# Patient Record
Sex: FEMALE | Race: WHITE | Employment: UNEMPLOYED | URBAN - METROPOLITAN AREA
[De-identification: names, ages, dates, MRNs, and addresses within clinical notes are randomized per-mention and may not be internally consistent; named-entity substitution may affect disease eponyms.]

---

## 2018-01-31 ENCOUNTER — HOSPITAL ENCOUNTER (EMERGENCY)
Age: 53
Discharge: HOME | End: 2018-01-31
Attending: EMERGENCY MEDICINE | Admitting: EMERGENCY MEDICINE
Payer: SELF-PAY

## 2018-01-31 VITALS
DIASTOLIC BLOOD PRESSURE: 110 MMHG | TEMPERATURE: 98.4 F | WEIGHT: 140 LBS | RESPIRATION RATE: 18 BRPM | HEIGHT: 65 IN | SYSTOLIC BLOOD PRESSURE: 184 MMHG | HEART RATE: 88 BPM | OXYGEN SATURATION: 98 % | BODY MASS INDEX: 23.32 KG/M2

## 2018-01-31 DIAGNOSIS — I10 ESSENTIAL HYPERTENSION: Primary | ICD-10-CM

## 2018-01-31 LAB
ANION GAP SERPL CALC-SCNC: 9 MMOL/L (ref 5–15)
BASOPHILS # BLD: 0.1 K/UL (ref 0–0.1)
BASOPHILS NFR BLD: 1 % (ref 0–1)
BUN SERPL-MCNC: 10 MG/DL (ref 6–20)
BUN/CREAT SERPL: 10 (ref 12–20)
CALCIUM SERPL-MCNC: 9.6 MG/DL (ref 8.5–10.1)
CHLORIDE SERPL-SCNC: 102 MMOL/L (ref 97–108)
CO2 SERPL-SCNC: 29 MMOL/L (ref 21–32)
CREAT SERPL-MCNC: 1.03 MG/DL (ref 0.55–1.02)
DIFFERENTIAL METHOD BLD: ABNORMAL
EOSINOPHIL # BLD: 0.1 K/UL (ref 0–0.4)
EOSINOPHIL NFR BLD: 1 % (ref 0–7)
ERYTHROCYTE [DISTWIDTH] IN BLOOD BY AUTOMATED COUNT: 17.7 % (ref 11.5–14.5)
GLUCOSE SERPL-MCNC: 85 MG/DL (ref 65–100)
HCT VFR BLD AUTO: 45.7 % (ref 35–47)
HGB BLD-MCNC: 14.3 G/DL (ref 11.5–16)
IMM GRANULOCYTES # BLD: 0 K/UL (ref 0–0.04)
IMM GRANULOCYTES NFR BLD AUTO: 0 % (ref 0–0.5)
LYMPHOCYTES # BLD: 2.9 K/UL (ref 0.8–3.5)
LYMPHOCYTES NFR BLD: 25 % (ref 12–49)
MAGNESIUM SERPL-MCNC: 2.2 MG/DL (ref 1.6–2.4)
MCH RBC QN AUTO: 26 PG (ref 26–34)
MCHC RBC AUTO-ENTMCNC: 31.3 G/DL (ref 30–36.5)
MCV RBC AUTO: 83.1 FL (ref 80–99)
MONOCYTES # BLD: 0.7 K/UL (ref 0–1)
MONOCYTES NFR BLD: 6 % (ref 5–13)
NEUTS SEG # BLD: 7.6 K/UL (ref 1.8–8)
NEUTS SEG NFR BLD: 67 % (ref 32–75)
NRBC # BLD: 0 K/UL (ref 0–0.01)
NRBC BLD-RTO: 0 PER 100 WBC
PLATELET # BLD AUTO: 354 K/UL (ref 150–400)
PMV BLD AUTO: 8.8 FL (ref 8.9–12.9)
POTASSIUM SERPL-SCNC: 3.4 MMOL/L (ref 3.5–5.1)
RBC # BLD AUTO: 5.5 M/UL (ref 3.8–5.2)
SODIUM SERPL-SCNC: 140 MMOL/L (ref 136–145)
WBC # BLD AUTO: 11.3 K/UL (ref 3.6–11)

## 2018-01-31 PROCEDURE — 74011250637 HC RX REV CODE- 250/637: Performed by: EMERGENCY MEDICINE

## 2018-01-31 PROCEDURE — 80048 BASIC METABOLIC PNL TOTAL CA: CPT | Performed by: EMERGENCY MEDICINE

## 2018-01-31 PROCEDURE — 85025 COMPLETE CBC W/AUTO DIFF WBC: CPT | Performed by: EMERGENCY MEDICINE

## 2018-01-31 PROCEDURE — 94762 N-INVAS EAR/PLS OXIMTRY CONT: CPT

## 2018-01-31 PROCEDURE — 83735 ASSAY OF MAGNESIUM: CPT | Performed by: EMERGENCY MEDICINE

## 2018-01-31 PROCEDURE — 36415 COLL VENOUS BLD VENIPUNCTURE: CPT | Performed by: EMERGENCY MEDICINE

## 2018-01-31 PROCEDURE — 99284 EMERGENCY DEPT VISIT MOD MDM: CPT

## 2018-01-31 RX ORDER — LISINOPRIL 5 MG/1
5 TABLET ORAL DAILY
Qty: 30 TAB | Refills: 0 | Status: SHIPPED | OUTPATIENT
Start: 2018-01-31 | End: 2018-03-02

## 2018-01-31 RX ORDER — LISINOPRIL 5 MG/1
5 TABLET ORAL
Status: COMPLETED | OUTPATIENT
Start: 2018-01-31 | End: 2018-01-31

## 2018-01-31 RX ADMIN — LISINOPRIL 5 MG: 5 TABLET ORAL at 15:41

## 2018-01-31 NOTE — DISCHARGE INSTRUCTIONS
High Blood Pressure: Care Instructions  Your Care Instructions    If your blood pressure is usually above 140/90, you have high blood pressure, or hypertension. That means the top number is 140 or higher or the bottom number is 90 or higher, or both. Despite what a lot of people think, high blood pressure usually doesn't cause headaches or make you feel dizzy or lightheaded. It usually has no symptoms. But it does increase your risk for heart attack, stroke, and kidney or eye damage. The higher your blood pressure, the more your risk increases. Your doctor will give you a goal for your blood pressure. Your goal will be based on your health and your age. An example of a goal is to keep your blood pressure below 140/90. Lifestyle changes, such as eating healthy and being active, are always important to help lower blood pressure. You might also take medicine to reach your blood pressure goal.  Follow-up care is a key part of your treatment and safety. Be sure to make and go to all appointments, and call your doctor if you are having problems. It's also a good idea to know your test results and keep a list of the medicines you take. How can you care for yourself at home? Medical treatment  · If you stop taking your medicine, your blood pressure will go back up. You may take one or more types of medicine to lower your blood pressure. Be safe with medicines. Take your medicine exactly as prescribed. Call your doctor if you think you are having a problem with your medicine. · Talk to your doctor before you start taking aspirin every day. Aspirin can help certain people lower their risk of a heart attack or stroke. But taking aspirin isn't right for everyone, because it can cause serious bleeding. · See your doctor regularly. You may need to see the doctor more often at first or until your blood pressure comes down.   · If you are taking blood pressure medicine, talk to your doctor before you take decongestants or anti-inflammatory medicine, such as ibuprofen. Some of these medicines can raise blood pressure. · Learn how to check your blood pressure at home. Lifestyle changes  · Stay at a healthy weight. This is especially important if you put on weight around the waist. Losing even 10 pounds can help you lower your blood pressure. · If your doctor recommends it, get more exercise. Walking is a good choice. Bit by bit, increase the amount you walk every day. Try for at least 30 minutes on most days of the week. You also may want to swim, bike, or do other activities. · Avoid or limit alcohol. Talk to your doctor about whether you can drink any alcohol. · Try to limit how much sodium you eat to less than 2,300 milligrams (mg) a day. Your doctor may ask you to try to eat less than 1,500 mg a day. · Eat plenty of fruits (such as bananas and oranges), vegetables, legumes, whole grains, and low-fat dairy products. · Lower the amount of saturated fat in your diet. Saturated fat is found in animal products such as milk, cheese, and meat. Limiting these foods may help you lose weight and also lower your risk for heart disease. · Do not smoke. Smoking increases your risk for heart attack and stroke. If you need help quitting, talk to your doctor about stop-smoking programs and medicines. These can increase your chances of quitting for good. When should you call for help? Call 911 anytime you think you may need emergency care. This may mean having symptoms that suggest that your blood pressure is causing a serious heart or blood vessel problem. Your blood pressure may be over 180/110. ? For example, call 911 if:  ? · You have symptoms of a heart attack. These may include:  ¨ Chest pain or pressure, or a strange feeling in the chest.  ¨ Sweating. ¨ Shortness of breath. ¨ Nausea or vomiting.   ¨ Pain, pressure, or a strange feeling in the back, neck, jaw, or upper belly or in one or both shoulders or arms.  ¨ Lightheadedness or sudden weakness. ¨ A fast or irregular heartbeat. ? · You have symptoms of a stroke. These may include:  ¨ Sudden numbness, tingling, weakness, or loss of movement in your face, arm, or leg, especially on only one side of your body. ¨ Sudden vision changes. ¨ Sudden trouble speaking. ¨ Sudden confusion or trouble understanding simple statements. ¨ Sudden problems with walking or balance. ¨ A sudden, severe headache that is different from past headaches. ? · You have severe back or belly pain. ?Do not wait until your blood pressure comes down on its own. Get help right away. ?Call your doctor now or seek immediate care if:  ? · Your blood pressure is much higher than normal (such as 180/110 or higher), but you don't have symptoms. ? · You think high blood pressure is causing symptoms, such as:  ¨ Severe headache. ¨ Blurry vision. ? Watch closely for changes in your health, and be sure to contact your doctor if:  ? · Your blood pressure measures 140/90 or higher at least 2 times. That means the top number is 140 or higher or the bottom number is 90 or higher, or both. ? · You think you may be having side effects from your blood pressure medicine. ? · Your blood pressure is usually normal, but it goes above normal at least 2 times. Where can you learn more? Go to http://marielena-alfredo.info/. Enter G432 in the search box to learn more about \"High Blood Pressure: Care Instructions. \"  Current as of: September 21, 2016  Content Version: 11.4  © 0189-6197 [employer]. Care instructions adapted under license by [employer] (which disclaims liability or warranty for this information). If you have questions about a medical condition or this instruction, always ask your healthcare professional. [place] [address] any warranty or liability for your use of this information.          Low Sodium Diet (2,000 Milligram): Care Instructions  Your Care Instructions    Too much sodium causes your body to hold on to extra water. This can raise your blood pressure and force your heart and kidneys to work harder. In very serious cases, this could cause you to be put in the hospital. It might even be life-threatening. By limiting sodium, you will feel better and lower your risk of serious problems. The most common source of sodium is salt. People get most of the salt in their diet from canned, prepared, and packaged foods. Fast food and restaurant meals also are very high in sodium. Your doctor will probably limit your sodium to less than 2,000 milligrams (mg) a day. This limit counts all the sodium in prepared and packaged foods and any salt you add to your food. Follow-up care is a key part of your treatment and safety. Be sure to make and go to all appointments, and call your doctor if you are having problems. It's also a good idea to know your test results and keep a list of the medicines you take. How can you care for yourself at home? Read food labels  · Read labels on cans and food packages. The labels tell you how much sodium is in each serving. Make sure that you look at the serving size. If you eat more than the serving size, you have eaten more sodium. · Food labels also tell you the Percent Daily Value for sodium. Choose products with low Percent Daily Values for sodium. · Be aware that sodium can come in forms other than salt, including monosodium glutamate (MSG), sodium citrate, and sodium bicarbonate (baking soda). MSG is often added to Asian food. When you eat out, you can sometimes ask for food without MSG or added salt. Buy low-sodium foods  · Buy foods that are labeled \"unsalted\" (no salt added), \"sodium-free\" (less than 5 mg of sodium per serving), or \"low-sodium\" (less than 140 mg of sodium per serving). Foods labeled \"reduced-sodium\" and \"light sodium\" may still have too much sodium.  Be sure to read the label to see how much sodium you are getting. · Buy fresh vegetables, or frozen vegetables without added sauces. Buy low-sodium versions of canned vegetables, soups, and other canned goods. Prepare low-sodium meals  · Cut back on the amount of salt you use in cooking. This will help you adjust to the taste. Do not add salt after cooking. One teaspoon of salt has about 2,300 mg of sodium. · Take the salt shaker off the table. · Flavor your food with garlic, lemon juice, onion, vinegar, herbs, and spices. Do not use soy sauce, lite soy sauce, steak sauce, onion salt, garlic salt, celery salt, mustard, or ketchup on your food. · Use low-sodium salad dressings, sauces, and ketchup. Or make your own salad dressings and sauces without adding salt. · Use less salt (or none) when recipes call for it. You can often use half the salt a recipe calls for without losing flavor. Other foods such as rice, pasta, and grains do not need added salt. · Rinse canned vegetables, and cook them in fresh water. This removes some-but not all-of the salt. · Avoid water that is naturally high in sodium or that has been treated with water softeners, which add sodium. Call your local water company to find out the sodium content of your water supply. If you buy bottled water, read the label and choose a sodium-free brand. Avoid high-sodium foods  · Avoid eating:  ¨ Smoked, cured, salted, and canned meat, fish, and poultry. ¨ Ham, santamaria, hot dogs, and luncheon meats. ¨ Regular, hard, and processed cheese and regular peanut butter. ¨ Crackers with salted tops, and other salted snack foods such as pretzels, chips, and salted popcorn. ¨ Frozen prepared meals, unless labeled low-sodium. ¨ Canned and dried soups, broths, and bouillon, unless labeled sodium-free or low-sodium. ¨ Canned vegetables, unless labeled sodium-free or low-sodium. ¨ [employer] fries, pizza, tacos, and other fast foods.   ¨ Pickles, olives, ketchup, and other condiments, especially soy sauce, unless labeled sodium-free or low-sodium. Where can you learn more? Go to http://marielena-alfredo.info/. Enter I570 in the search box to learn more about \"Low Sodium Diet (2,000 Milligram): Care Instructions. \"  Current as of: May 12, 2017  Content Version: 11.4  © 6985-6064 [employer]. Care instructions adapted under license by [employer] (which disclaims liability or warranty for this information). If you have questions about a medical condition or this instruction, always ask your healthcare professional. [place] [address] any warranty or liability for your use of this information.

## 2018-01-31 NOTE — ED PROVIDER NOTES
HPI Comments: 47 y.o. female with no significant past medical history who presents from home via private vehicle with chief complaint of sinus congestion and HTN. Pt reports that she has had 3 weeks of persistent sinus congestion and pressure that she has been taking Ibuprofen for with minimal relief. However, she has been checking her BP regularly throughout the past few weeks and has noted that her blood pressure has been elevated. Today she had two tiffany readings of 185/115 and 191/117 that concerned her and prompted her to come to the ED. She reports that her brother was recently discharged from the hospital following a CVA which made her increasingly concerned about her elevated BP. She reports that several years ago she was seen at a [facility] clinic and had a mildly elevated BP at that time and was given a sample pack of a BP medication, but after taking a few doses of the medication had markedly low BP and stopped taking it. She reports that she has not had any problems with her BP since that event until now. Pt denies CP, SOB, palpitations, leg swelling, nausea, vomiting, abdominal pain, change in urination, fever, chills. There are no other acute medical concerns at this time. Social hx: Pt admits that she smokes cigarettes daily. Significant FMHx:  PCP: Kaden Wasserman NP  Note written by rae Hernandez, as dictated by Keenan Orellaan MD 2:08 PM        The history is provided by the patient. No past medical history on file. No past surgical history on file. Family History:   Problem Relation Age of Onset    Hypertension Mother     Thyroid Disease Mother     Cancer Father        Social History     Social History    Marital status: UNKNOWN     Spouse name: N/A    Number of children: N/A    Years of education: N/A     Occupational History    Not on file.      Social History Main Topics    Smoking status: Current Every Day Smoker     Packs/day: 0.50    Smokeless tobacco: Current User    Alcohol use Yes      Comment: Rare     Drug use: No    Sexual activity: Not on file     Other Topics Concern    Not on file     Social History Narrative    No narrative on file         ALLERGIES: Review of patient's allergies indicates no known allergies. Review of Systems   Constitutional: Negative for chills and fever. HENT: Positive for congestion, rhinorrhea and sinus pressure. Negative for sore throat. Respiratory: Negative for cough and shortness of breath. Gastrointestinal: Negative for abdominal pain, constipation, diarrhea, nausea and vomiting. Genitourinary: Negative for dysuria, flank pain, frequency and hematuria. Musculoskeletal: Negative for back pain. All other systems reviewed and are negative. Vitals:    01/31/18 1341   BP: (!) 167/109   Pulse: 95   Resp: 16   Temp: 98.4 °F (36.9 °C)   SpO2: 100%   Weight: 63.5 kg (140 lb)   Height: 5' 5\" (1.651 m)            Physical Exam   Constitutional: She is oriented to person, place, and time. She appears well-developed and well-nourished. No distress. HENT:   Head: Normocephalic and atraumatic. Eyes: Conjunctivae and EOM are normal. Pupils are equal, round, and reactive to light. Neck: Normal range of motion. Cardiovascular: Normal rate, regular rhythm, normal heart sounds and intact distal pulses. Exam reveals no friction rub. No murmur heard. Pulmonary/Chest: Effort normal and breath sounds normal. No respiratory distress. She has no wheezes. She has no rales. Abdominal: Soft. Bowel sounds are normal. She exhibits no distension. There is no tenderness. There is no rebound and no guarding. Musculoskeletal: Normal range of motion. She exhibits no edema or tenderness. Neurological: She is alert and oriented to person, place, and time. No cranial nerve deficit. Coordination normal.   Skin: Skin is warm and dry. She is not diaphoretic. No pallor.    Psychiatric: She has a normal mood and affect. Her behavior is normal.   Nursing note and vitals reviewed. MDM  Number of Diagnoses or Management Options  Essential hypertension:   Diagnosis management comments: High bp- No cp. Check creatinine,Leg cramps- last seconds and move- does not sound like dvt, arterial issue, check lytes ? Restless legs electrolytes for leg cramps though palpable pulses and well appearing          Amount and/or Complexity of Data Reviewed  Clinical lab tests: ordered and reviewed  Obtain history from someone other than the patient: yes (family)    Patient Progress  Patient progress: stable        ED Course       Procedures    3:25 PM  Spoke with pt and discussed risks of uncontrolled htn and reasons to return. She will need to follow up with Rosalinda Nowak and will get care card info on way out. She will likely need meds adjusted.  Discussed side effects of lisinopril including angioedema but she wants to try it because her brother is on it

## 2018-01-31 NOTE — ED TRIAGE NOTES
Pt states having elevated blood pressure for maico two weeks. Does not have a PCP. States she feels bad all over, aches in her legs. Initially thought it was from sinus pressure.

## 2018-01-31 NOTE — ED NOTES
Dr. Rick Weinstein reviewed discharge instructions with the patient. The patient verbalized understanding.

## 2018-12-07 ENCOUNTER — HOSPITAL ENCOUNTER (EMERGENCY)
Age: 53
Discharge: HOME | End: 2018-12-07
Attending: EMERGENCY MEDICINE
Payer: SELF-PAY

## 2018-12-07 VITALS
WEIGHT: 140 LBS | HEIGHT: 65 IN | HEART RATE: 72 BPM | RESPIRATION RATE: 16 BRPM | OXYGEN SATURATION: 99 % | SYSTOLIC BLOOD PRESSURE: 160 MMHG | TEMPERATURE: 98.1 F | DIASTOLIC BLOOD PRESSURE: 108 MMHG | BODY MASS INDEX: 23.32 KG/M2

## 2018-12-07 DIAGNOSIS — I10 ESSENTIAL HYPERTENSION: ICD-10-CM

## 2018-12-07 DIAGNOSIS — B34.9 VIRAL ILLNESS: Primary | ICD-10-CM

## 2018-12-07 DIAGNOSIS — E86.0 MILD DEHYDRATION: ICD-10-CM

## 2018-12-07 LAB
ANION GAP SERPL CALC-SCNC: 10 MMOL/L (ref 5–15)
ANION GAP SERPL CALC-SCNC: 12 MMOL/L (ref 5–15)
APPEARANCE UR: ABNORMAL
BACTERIA URNS QL MICRO: ABNORMAL /HPF
BASOPHILS # BLD: 0.1 K/UL (ref 0–0.1)
BASOPHILS NFR BLD: 1 % (ref 0–1)
BILIRUB UR QL CFM: NEGATIVE
BUN SERPL-MCNC: 10 MG/DL (ref 6–20)
BUN SERPL-MCNC: 9 MG/DL (ref 6–20)
BUN/CREAT SERPL: 10 (ref 12–20)
BUN/CREAT SERPL: 13 (ref 12–20)
CALCIUM SERPL-MCNC: 9.6 MG/DL (ref 8.5–10.1)
CALCIUM SERPL-MCNC: 9.7 MG/DL (ref 8.5–10.1)
CHLORIDE SERPL-SCNC: 105 MMOL/L (ref 97–108)
CHLORIDE SERPL-SCNC: 105 MMOL/L (ref 97–108)
CO2 SERPL-SCNC: 20 MMOL/L (ref 21–32)
CO2 SERPL-SCNC: 27 MMOL/L (ref 21–32)
COLOR UR: ABNORMAL
COMMENT, HOLDF: NORMAL
CREAT SERPL-MCNC: 0.76 MG/DL (ref 0.55–1.02)
CREAT SERPL-MCNC: 0.92 MG/DL (ref 0.55–1.02)
DIFFERENTIAL METHOD BLD: ABNORMAL
EOSINOPHIL # BLD: 0 K/UL (ref 0–0.4)
EOSINOPHIL NFR BLD: 0 % (ref 0–7)
EPITH CASTS URNS QL MICRO: ABNORMAL /LPF
ERYTHROCYTE [DISTWIDTH] IN BLOOD BY AUTOMATED COUNT: 13.8 % (ref 11.5–14.5)
GLUCOSE SERPL-MCNC: 107 MG/DL (ref 65–100)
GLUCOSE SERPL-MCNC: 108 MG/DL (ref 65–100)
GLUCOSE UR STRIP.AUTO-MCNC: NEGATIVE MG/DL
HCT VFR BLD AUTO: 50.2 % (ref 35–47)
HGB BLD-MCNC: 16.8 G/DL (ref 11.5–16)
HGB UR QL STRIP: NEGATIVE
HYALINE CASTS URNS QL MICRO: ABNORMAL /LPF (ref 0–5)
IMM GRANULOCYTES # BLD: 0 K/UL (ref 0–0.04)
IMM GRANULOCYTES NFR BLD AUTO: 0 % (ref 0–0.5)
KETONES UR QL STRIP.AUTO: ABNORMAL MG/DL
LEUKOCYTE ESTERASE UR QL STRIP.AUTO: NEGATIVE
LYMPHOCYTES # BLD: 1.6 K/UL (ref 0.8–3.5)
LYMPHOCYTES NFR BLD: 16 % (ref 12–49)
MCH RBC QN AUTO: 30.6 PG (ref 26–34)
MCHC RBC AUTO-ENTMCNC: 33.5 G/DL (ref 30–36.5)
MCV RBC AUTO: 91.4 FL (ref 80–99)
MONOCYTES # BLD: 0.4 K/UL (ref 0–1)
MONOCYTES NFR BLD: 4 % (ref 5–13)
MUCOUS THREADS URNS QL MICRO: ABNORMAL /LPF
NEUTS SEG # BLD: 8.2 K/UL (ref 1.8–8)
NEUTS SEG NFR BLD: 79 % (ref 32–75)
NITRITE UR QL STRIP.AUTO: NEGATIVE
NRBC # BLD: 0 K/UL (ref 0–0.01)
NRBC BLD-RTO: 0 PER 100 WBC
PH UR STRIP: 7 [PH] (ref 5–8)
PLATELET # BLD AUTO: 309 K/UL (ref 150–400)
PMV BLD AUTO: 9.6 FL (ref 8.9–12.9)
POTASSIUM SERPL-SCNC: 3.8 MMOL/L (ref 3.5–5.1)
POTASSIUM SERPL-SCNC: ABNORMAL MMOL/L (ref 3.5–5.1)
PROT UR STRIP-MCNC: 100 MG/DL
RBC # BLD AUTO: 5.49 M/UL (ref 3.8–5.2)
RBC #/AREA URNS HPF: ABNORMAL /HPF (ref 0–5)
SAMPLES BEING HELD,HOLD: NORMAL
SODIUM SERPL-SCNC: 137 MMOL/L (ref 136–145)
SODIUM SERPL-SCNC: 142 MMOL/L (ref 136–145)
SP GR UR REFRACTOMETRY: 1.02 (ref 1–1.03)
UR CULT HOLD, URHOLD: NORMAL
UROBILINOGEN UR QL STRIP.AUTO: 1 EU/DL (ref 0.2–1)
WBC # BLD AUTO: 10.3 K/UL (ref 3.6–11)
WBC URNS QL MICRO: ABNORMAL /HPF (ref 0–4)

## 2018-12-07 PROCEDURE — 80048 BASIC METABOLIC PNL TOTAL CA: CPT

## 2018-12-07 PROCEDURE — 85025 COMPLETE CBC W/AUTO DIFF WBC: CPT

## 2018-12-07 PROCEDURE — 74011250637 HC RX REV CODE- 250/637: Performed by: EMERGENCY MEDICINE

## 2018-12-07 PROCEDURE — 99283 EMERGENCY DEPT VISIT LOW MDM: CPT

## 2018-12-07 PROCEDURE — 36415 COLL VENOUS BLD VENIPUNCTURE: CPT

## 2018-12-07 PROCEDURE — 81001 URINALYSIS AUTO W/SCOPE: CPT

## 2018-12-07 RX ORDER — LISINOPRIL 10 MG/1
10 TABLET ORAL DAILY
Qty: 30 TAB | Refills: 0 | Status: SHIPPED | OUTPATIENT
Start: 2018-12-07 | End: 2019-01-06

## 2018-12-07 RX ORDER — ACETAMINOPHEN 500 MG
1000 TABLET ORAL
Status: COMPLETED | OUTPATIENT
Start: 2018-12-07 | End: 2018-12-07

## 2018-12-07 RX ADMIN — ACETAMINOPHEN 1000 MG: 500 TABLET ORAL at 11:46

## 2018-12-07 NOTE — ED NOTES
Pt reports she was prescribed lisinopril previously for her HTN, but she stopped taking this medicine because \"my blood pressure was too low\". Pt does not have a PCP currently to follow up with this condition. Pt requesting IVF, provider notified. No new orders received. Oral fluids provided to pt, pt tolerating oral fluids well. Introduced self as primary RN to pt. Answered any questions and/or concerns. Call bell within reach of pt. Addressed basic needs/comfort measures at this time.

## 2018-12-07 NOTE — DISCHARGE INSTRUCTIONS
Dehydration: Care Instructions  Your Care Instructions  Dehydration happens when your body loses too much fluid. This might happen when you do not drink enough water or you lose large amounts of fluids from your body because of diarrhea, vomiting, or sweating. Severe dehydration can be life-threatening. Water and minerals called electrolytes help put your body fluids back in balance. Learn the early signs of fluid loss, and drink more fluids to prevent dehydration. Follow-up care is a key part of your treatment and safety. Be sure to make and go to all appointments, and call your doctor if you are having problems. It's also a good idea to know your test results and keep a list of the medicines you take. How can you care for yourself at home? · To prevent dehydration, drink plenty of fluids, enough so that your urine is light yellow or clear like water. Choose water and other caffeine-free clear liquids until you feel better. If you have kidney, heart, or liver disease and have to limit fluids, talk with your doctor before you increase the amount of fluids you drink. · If you do not feel like eating or drinking, try taking small sips of water, sports drinks, or other rehydration drinks. · Get plenty of rest.  To prevent dehydration  · Add more fluids to your diet and daily routine, unless your doctor has told you not to. · During hot weather, drink more fluids. Drink even more fluids if you exercise a lot. Stay away from drinks with alcohol or caffeine. · Watch for the symptoms of dehydration. These include:  ? A dry, sticky mouth. ? Dark yellow urine, and not much of it. ? Dry and sunken eyes. ? Feeling very tired. · Learn what problems can lead to dehydration. These include:  ? Diarrhea, fever, and vomiting. ? Any illness with a fever, such as pneumonia or the flu. ? Activities that cause heavy sweating, such as endurance races and heavy outdoor work in hot or humid weather. ?  Alcohol or drug abuse or withdrawal.  ? Certain medicines, such as cold and allergy pills (antihistamines), diet pills (diuretics), and laxatives. ? Certain diseases, such as diabetes, cancer, and heart or kidney disease. When should you call for help? Call 911 anytime you think you may need emergency care. For example, call if:    · You passed out (lost consciousness).    Call your doctor now or seek immediate medical care if:    · You are confused and cannot think clearly.     · You are dizzy or lightheaded, or you feel like you may faint.     · You have signs of needing more fluids. You have sunken eyes and a dry mouth, and you pass only a little dark urine.     · You cannot keep fluids down.    Watch closely for changes in your health, and be sure to contact your doctor if:    · You are not making tears.     · Your skin is very dry and sags slowly back into place after you pinch it.     · Your mouth and eyes are very dry. Where can you learn more? Go to http://marielena-alfredo.info/. Enter R627 in the search box to learn more about \"Dehydration: Care Instructions. \"  Current as of: November 20, 2017  Content Version: 11.8  © 1096-9983 [employer]. Care instructions adapted under license by [employer] (which disclaims liability or warranty for this information). If you have questions about a medical condition or this instruction, always ask your healthcare professional. [place] [address] any warranty or liability for your use of this information. High Blood Pressure: Care Instructions  Your Care Instructions    If your blood pressure is usually above 130/80, you have high blood pressure, or hypertension. That means the top number is 130 or higher or the bottom number is 80 or higher, or both. Despite what a lot of people think, high blood pressure usually doesn't cause headaches or make you feel dizzy or lightheaded. It usually has no symptoms.  But it does increase your risk for heart attack, stroke, and kidney or eye damage. The higher your blood pressure, the more your risk increases. Your doctor will give you a goal for your blood pressure. Your goal will be based on your health and your age. Lifestyle changes, such as eating healthy and being active, are always important to help lower blood pressure. You might also take medicine to reach your blood pressure goal.  Follow-up care is a key part of your treatment and safety. Be sure to make and go to all appointments, and call your doctor if you are having problems. It's also a good idea to know your test results and keep a list of the medicines you take. How can you care for yourself at home? Medical treatment  · If you stop taking your medicine, your blood pressure will go back up. You may take one or more types of medicine to lower your blood pressure. Be safe with medicines. Take your medicine exactly as prescribed. Call your doctor if you think you are having a problem with your medicine. · Talk to your doctor before you start taking aspirin every day. Aspirin can help certain people lower their risk of a heart attack or stroke. But taking aspirin isn't right for everyone, because it can cause serious bleeding. · See your doctor regularly. You may need to see the doctor more often at first or until your blood pressure comes down. · If you are taking blood pressure medicine, talk to your doctor before you take decongestants or anti-inflammatory medicine, such as ibuprofen. Some of these medicines can raise blood pressure. · Learn how to check your blood pressure at home. Lifestyle changes  · Stay at a healthy weight. This is especially important if you put on weight around the waist. Losing even 10 pounds can help you lower your blood pressure. · If your doctor recommends it, get more exercise. Walking is a good choice. Bit by bit, increase the amount you walk every day.  Try for at least 30 minutes on most days of the week. You also may want to swim, bike, or do other activities. · Avoid or limit alcohol. Talk to your doctor about whether you can drink any alcohol. · Try to limit how much sodium you eat to less than 2,300 milligrams (mg) a day. Your doctor may ask you to try to eat less than 1,500 mg a day. · Eat plenty of fruits (such as bananas and oranges), vegetables, legumes, whole grains, and low-fat dairy products. · Lower the amount of saturated fat in your diet. Saturated fat is found in animal products such as milk, cheese, and meat. Limiting these foods may help you lose weight and also lower your risk for heart disease. · Do not smoke. Smoking increases your risk for heart attack and stroke. If you need help quitting, talk to your doctor about stop-smoking programs and medicines. These can increase your chances of quitting for good. When should you call for help? Call 911 anytime you think you may need emergency care. This may mean having symptoms that suggest that your blood pressure is causing a serious heart or blood vessel problem. Your blood pressure may be over 180/120.   For example, call 911 if:    · You have symptoms of a heart attack. These may include:  ? Chest pain or pressure, or a strange feeling in the chest.  ? Sweating. ? Shortness of breath. ? Nausea or vomiting. ? Pain, pressure, or a strange feeling in the back, neck, jaw, or upper belly or in one or both shoulders or arms. ? Lightheadedness or sudden weakness. ? A fast or irregular heartbeat.     · You have symptoms of a stroke. These may include:  ? Sudden numbness, tingling, weakness, or loss of movement in your face, arm, or leg, especially on only one side of your body. ? Sudden vision changes. ? Sudden trouble speaking. ? Sudden confusion or trouble understanding simple statements. ? Sudden problems with walking or balance.   ? A sudden, severe headache that is different from past headaches.     · You have severe back or belly pain.    Do not wait until your blood pressure comes down on its own. Get help right away.   Call your doctor now or seek immediate care if:    · Your blood pressure is much higher than normal (such as 180/120 or higher), but you don't have symptoms.     · You think high blood pressure is causing symptoms, such as:  ? Severe headache.  ? Blurry vision.    Watch closely for changes in your health, and be sure to contact your doctor if:    · Your blood pressure measures higher than your doctor recommends at least 2 times. That means the top number is higher or the bottom number is higher, or both.     · You think you may be having side effects from your blood pressure medicine. Where can you learn more? Go to http://marielena-alfredo.info/. Enter F945 in the search box to learn more about \"High Blood Pressure: Care Instructions. \"  Current as of: December 6, 2017  Content Version: 11.8  © 7566-6620 [employer]. Care instructions adapted under license by [employer] (which disclaims liability or warranty for this information). If you have questions about a medical condition or this instruction, always ask your healthcare professional. [place] [address] any warranty or liability for your use of this information. Viral Infections: Care Instructions  Your Care Instructions    You don't feel well, but it's not clear what's causing it. You may have a viral infection. Viruses cause many illnesses, such as the common cold, influenza, fever, rashes, and the diarrhea, nausea, and vomiting that are often called \"stomach flu. \" You may wonder if antibiotic medicines could make you feel better. But antibiotics only treat infections caused by bacteria. They don't work on viruses. The good news is that viral infections usually aren't serious. Most will go away in a few days without medical treatment.  In the meantime, there are a few things you can do to make yourself more comfortable. Follow-up care is a key part of your treatment and safety. Be sure to make and go to all appointments, and call your doctor if you are having problems. It's also a good idea to know your test results and keep a list of the medicines you take. How can you care for yourself at home? · Get plenty of rest if you feel tired. · Take an over-the-counter pain medicine if needed, such as acetaminophen (Tylenol), ibuprofen (Advil, Motrin), or naproxen (Aleve). Read and follow all instructions on the label. · Be careful when taking over-the-counter cold or flu medicines and Tylenol at the same time. Many of these medicines have acetaminophen, which is Tylenol. Read the labels to make sure that you are not taking more than the recommended dose. Too much acetaminophen (Tylenol) can be harmful. · Drink plenty of fluids, enough so that your urine is light yellow or clear like water. If you have kidney, heart, or liver disease and have to limit fluids, talk with your doctor before you increase the amount of fluids you drink. · Stay home from work, school, and other public places while you have a fever. When should you call for help? Call 911 anytime you think you may need emergency care. For example, call if:    · You have severe trouble breathing.     · You passed out (lost consciousness).    Call your doctor now or seek immediate medical care if:    · You seem to be getting much sicker.     · You have a new or higher fever.     · You have blood in your stools.     · You have new belly pain, or your pain gets worse.     · You have a new rash.    Watch closely for changes in your health, and be sure to contact your doctor if:    · You start to get better and then get worse.     · You do not get better as expected. Where can you learn more? Go to http://marielena-alfredo.info/.   Enter Z951 in the search box to learn more about \"Viral Infections: Care Instructions. \"  Current as of: November 18, 2017  Content Version: 11.8  © 1528-8797 [employer], [facility]. Care instructions adapted under license by [employer] (which disclaims liability or warranty for this information). If you have questions about a medical condition or this instruction, always ask your healthcare professional. [place] [address] any warranty or liability for your use of this information.

## 2018-12-07 NOTE — ED PROVIDER NOTES
The history is provided by the patient. Headache This is a new problem. The current episode started 2 days ago. Associated with: congestion. The pain is located in the occipital region. The quality of the pain is described as dull. Associated symptoms include malaise/fatigue and vomiting (5x this morning). Pertinent negatives include no fever and no shortness of breath. She has tried nothing for the symptoms. Noted hypertension, patient was supposed to be on 10 mg lisinopril and has been noncompliant for multiple years. Took her BP after she started having symptoms and noted it to be elevated. No past medical history on file. No past surgical history on file. Family History:  
Problem Relation Age of Onset  Hypertension Mother  Thyroid Disease Mother  Cancer Father Social History Socioeconomic History  Marital status:  Spouse name: Not on file  Number of children: Not on file  Years of education: Not on file  Highest education level: Not on file Social Needs  Financial resource strain: Not on file  Food insecurity - worry: Not on file  Food insecurity - inability: Not on file  Transportation needs - medical: Not on file  Transportation needs - non-medical: Not on file Occupational History  Not on file Tobacco Use  Smoking status: Current Every Day Smoker Packs/day: 0.50  Smokeless tobacco: Current User Substance and Sexual Activity  Alcohol use: Yes Comment: Rare  Drug use: No  
 Sexual activity: Not on file Other Topics Concern  Not on file Social History Narrative  Not on file ALLERGIES: Patient has no known allergies. Review of Systems Constitutional: Positive for appetite change, chills and malaise/fatigue. Negative for fever. Respiratory: Negative for cough and shortness of breath. Gastrointestinal: Positive for vomiting (5x this morning). Neurological: Positive for headaches. All other systems reviewed and are negative. Vitals:  
 12/07/18 1129 12/07/18 1250 BP: (!) 212/108 (!) 160/108 Pulse: 86 72 Resp: 14 16 Temp: 98.1 °F (36.7 °C) SpO2: 99% 99% Weight: 63.5 kg (140 lb) Height: 5' 5\" (1.651 m) Physical Exam  
Constitutional: She appears well-developed and well-nourished. No distress. HENT:  
Head: Normocephalic and atraumatic. Eyes: Conjunctivae and EOM are normal.  
Neck: Neck supple. Cardiovascular: Normal rate, regular rhythm and normal heart sounds. Pulmonary/Chest: Effort normal and breath sounds normal. No respiratory distress. Abdominal: She exhibits no distension. There is no tenderness. There is no guarding. Musculoskeletal: Normal range of motion. She exhibits no deformity. Neurological: She is alert. No cranial nerve deficit. Skin: Skin is warm and dry. Psychiatric: Her behavior is normal.  
Nursing note and vitals reviewed. MDM 
   
47 y.o. female presents with malaise, weakness, headache, and some vomiting with mostly viral symptoms this morning. Provided tylenol for symptoms. She has been noncompliant with BP meds for multiple years and I suspect this is why her BP is elevated along with her discomfort from current illness. Lab screening for end-organ damage and screening for UTI with symptoms. The patient's symptoms are most consistent with a mild viral illness. She is well-appearing, she appears slightly hemoconcentrated suggesting an element of dehydration. Recommended oral rehydration therapy and other supportive care measures prior to primary care followup. She will be restarted on her lisinopril for her chronic hypertension. Plan to follow up with PCP as needed and return precautions discussed for worsening or new concerning symptoms. Procedures

## 2023-09-25 ENCOUNTER — APPOINTMENT (OUTPATIENT)
Facility: HOSPITAL | Age: 58
End: 2023-09-25

## 2023-09-25 ENCOUNTER — HOSPITAL ENCOUNTER (EMERGENCY)
Facility: HOSPITAL | Age: 58
Discharge: HOME | End: 2023-09-25
Attending: EMERGENCY MEDICINE

## 2023-09-25 VITALS
SYSTOLIC BLOOD PRESSURE: 158 MMHG | DIASTOLIC BLOOD PRESSURE: 104 MMHG | OXYGEN SATURATION: 98 % | HEART RATE: 96 BPM | WEIGHT: 150 LBS | BODY MASS INDEX: 24.99 KG/M2 | HEIGHT: 65 IN | RESPIRATION RATE: 16 BRPM | TEMPERATURE: 98.5 F

## 2023-09-25 DIAGNOSIS — R53.83 MALAISE AND FATIGUE: ICD-10-CM

## 2023-09-25 DIAGNOSIS — R53.81 MALAISE AND FATIGUE: ICD-10-CM

## 2023-09-25 DIAGNOSIS — M79.10 MYALGIA: Primary | ICD-10-CM

## 2023-09-25 LAB
ALBUMIN SERPL-MCNC: 3 G/DL (ref 3.5–5)
ALBUMIN/GLOB SERPL: 0.7 (ref 1.1–2.2)
ALP SERPL-CCNC: 120 U/L (ref 45–117)
ALT SERPL-CCNC: 69 U/L (ref 12–78)
ANION GAP SERPL CALC-SCNC: 4 MMOL/L (ref 5–15)
APPEARANCE UR: CLEAR
AST SERPL-CCNC: 40 U/L (ref 15–37)
BACTERIA URNS QL MICRO: NEGATIVE /HPF
BASOPHILS # BLD: 0 K/UL (ref 0–0.1)
BASOPHILS NFR BLD: 1 % (ref 0–1)
BILIRUB SERPL-MCNC: 0.3 MG/DL (ref 0.2–1)
BILIRUB UR QL: NEGATIVE
BUN SERPL-MCNC: 7 MG/DL (ref 6–20)
BUN/CREAT SERPL: 11 (ref 12–20)
CALCIUM SERPL-MCNC: 9.2 MG/DL (ref 8.5–10.1)
CHLORIDE SERPL-SCNC: 103 MMOL/L (ref 97–108)
CO2 SERPL-SCNC: 28 MMOL/L (ref 21–32)
COLOR UR: NORMAL
COMMENT:: NORMAL
CREAT SERPL-MCNC: 0.65 MG/DL (ref 0.55–1.02)
CRP SERPL-MCNC: 4.82 MG/DL (ref 0–0.6)
DIFFERENTIAL METHOD BLD: ABNORMAL
EOSINOPHIL # BLD: 0.4 K/UL (ref 0–0.4)
EOSINOPHIL NFR BLD: 7 % (ref 0–7)
EPITH CASTS URNS QL MICRO: NORMAL /LPF
ERYTHROCYTE [DISTWIDTH] IN BLOOD BY AUTOMATED COUNT: 11.9 % (ref 11.5–14.5)
ERYTHROCYTE [SEDIMENTATION RATE] IN BLOOD: 37 MM/HR (ref 0–30)
GLOBULIN SER CALC-MCNC: 4.1 G/DL (ref 2–4)
GLUCOSE SERPL-MCNC: 112 MG/DL (ref 65–100)
GLUCOSE UR STRIP.AUTO-MCNC: NEGATIVE MG/DL
HCT VFR BLD AUTO: 40 % (ref 35–47)
HGB BLD-MCNC: 13.5 G/DL (ref 11.5–16)
HGB UR QL STRIP: NEGATIVE
IMM GRANULOCYTES # BLD AUTO: 0 K/UL (ref 0–0.04)
IMM GRANULOCYTES NFR BLD AUTO: 0 % (ref 0–0.5)
KETONES UR QL STRIP.AUTO: NEGATIVE MG/DL
LEUKOCYTE ESTERASE UR QL STRIP.AUTO: NEGATIVE
LIPASE SERPL-CCNC: 137 U/L (ref 73–393)
LYMPHOCYTES # BLD: 2.3 K/UL (ref 0.8–3.5)
LYMPHOCYTES NFR BLD: 38 % (ref 12–49)
MAGNESIUM SERPL-MCNC: 2.5 MG/DL (ref 1.6–2.4)
MCH RBC QN AUTO: 31.1 PG (ref 26–34)
MCHC RBC AUTO-ENTMCNC: 33.8 G/DL (ref 30–36.5)
MCV RBC AUTO: 92.2 FL (ref 80–99)
MONOCYTES # BLD: 0.5 K/UL (ref 0–1)
MONOCYTES NFR BLD: 8 % (ref 5–13)
NEUTS SEG # BLD: 2.8 K/UL (ref 1.8–8)
NEUTS SEG NFR BLD: 46 % (ref 32–75)
NITRITE UR QL STRIP.AUTO: NEGATIVE
NRBC # BLD: 0 K/UL (ref 0–0.01)
NRBC BLD-RTO: 0 PER 100 WBC
PH UR STRIP: 6.5 (ref 5–8)
PLATELET # BLD AUTO: 371 K/UL (ref 150–400)
PMV BLD AUTO: 8.6 FL (ref 8.9–12.9)
POTASSIUM SERPL-SCNC: 4 MMOL/L (ref 3.5–5.1)
PROT SERPL-MCNC: 7.1 G/DL (ref 6.4–8.2)
PROT UR STRIP-MCNC: NEGATIVE MG/DL
RBC # BLD AUTO: 4.34 M/UL (ref 3.8–5.2)
RBC #/AREA URNS HPF: NORMAL /HPF (ref 0–5)
SODIUM SERPL-SCNC: 135 MMOL/L (ref 136–145)
SP GR UR REFRACTOMETRY: <1.005 (ref 1–1.03)
SPECIMEN HOLD: NORMAL
UROBILINOGEN UR QL STRIP.AUTO: 1 EU/DL (ref 0.2–1)
WBC # BLD AUTO: 6 K/UL (ref 3.6–11)
WBC URNS QL MICRO: NORMAL /HPF (ref 0–4)

## 2023-09-25 PROCEDURE — 83735 ASSAY OF MAGNESIUM: CPT

## 2023-09-25 PROCEDURE — 85652 RBC SED RATE AUTOMATED: CPT

## 2023-09-25 PROCEDURE — 99284 EMERGENCY DEPT VISIT MOD MDM: CPT

## 2023-09-25 PROCEDURE — 85025 COMPLETE CBC W/AUTO DIFF WBC: CPT

## 2023-09-25 PROCEDURE — 81001 URINALYSIS AUTO W/SCOPE: CPT

## 2023-09-25 PROCEDURE — 96374 THER/PROPH/DIAG INJ IV PUSH: CPT

## 2023-09-25 PROCEDURE — 2580000003 HC RX 258: Performed by: STUDENT IN AN ORGANIZED HEALTH CARE EDUCATION/TRAINING PROGRAM

## 2023-09-25 PROCEDURE — 96375 TX/PRO/DX INJ NEW DRUG ADDON: CPT

## 2023-09-25 PROCEDURE — 36415 COLL VENOUS BLD VENIPUNCTURE: CPT

## 2023-09-25 PROCEDURE — 71046 X-RAY EXAM CHEST 2 VIEWS: CPT

## 2023-09-25 PROCEDURE — 83690 ASSAY OF LIPASE: CPT

## 2023-09-25 PROCEDURE — 86140 C-REACTIVE PROTEIN: CPT

## 2023-09-25 PROCEDURE — 80053 COMPREHEN METABOLIC PANEL: CPT

## 2023-09-25 PROCEDURE — 96361 HYDRATE IV INFUSION ADD-ON: CPT

## 2023-09-25 PROCEDURE — 6360000002 HC RX W HCPCS: Performed by: STUDENT IN AN ORGANIZED HEALTH CARE EDUCATION/TRAINING PROGRAM

## 2023-09-25 RX ORDER — KETOROLAC TROMETHAMINE 15 MG/ML
15 INJECTION, SOLUTION INTRAMUSCULAR; INTRAVENOUS ONCE
Status: COMPLETED | OUTPATIENT
Start: 2023-09-25 | End: 2023-09-25

## 2023-09-25 RX ORDER — 0.9 % SODIUM CHLORIDE 0.9 %
1000 INTRAVENOUS SOLUTION INTRAVENOUS ONCE
Status: DISCONTINUED | OUTPATIENT
Start: 2023-09-25 | End: 2023-09-25

## 2023-09-25 RX ORDER — ONDANSETRON 2 MG/ML
4 INJECTION INTRAMUSCULAR; INTRAVENOUS ONCE
Status: COMPLETED | OUTPATIENT
Start: 2023-09-25 | End: 2023-09-25

## 2023-09-25 RX ORDER — PREDNISONE 20 MG/1
20 TABLET ORAL 2 TIMES DAILY
Qty: 10 TABLET | Refills: 0 | Status: SHIPPED | OUTPATIENT
Start: 2023-09-25 | End: 2023-09-30

## 2023-09-25 RX ORDER — 0.9 % SODIUM CHLORIDE 0.9 %
1000 INTRAVENOUS SOLUTION INTRAVENOUS ONCE
Status: COMPLETED | OUTPATIENT
Start: 2023-09-25 | End: 2023-09-25

## 2023-09-25 RX ADMIN — SODIUM CHLORIDE 1000 ML: 9 INJECTION, SOLUTION INTRAVENOUS at 13:19

## 2023-09-25 RX ADMIN — KETOROLAC TROMETHAMINE 15 MG: 15 INJECTION, SOLUTION INTRAMUSCULAR; INTRAVENOUS at 13:18

## 2023-09-25 RX ADMIN — ONDANSETRON 4 MG: 2 INJECTION INTRAMUSCULAR; INTRAVENOUS at 13:18

## 2023-09-25 ASSESSMENT — ENCOUNTER SYMPTOMS
EYE PAIN: 0
COUGH: 0
ABDOMINAL PAIN: 0
VOMITING: 0
SHORTNESS OF BREATH: 0
DIARRHEA: 0
NAUSEA: 0
SORE THROAT: 0

## 2023-09-25 ASSESSMENT — PAIN SCALES - GENERAL
PAINLEVEL_OUTOF10: 8
PAINLEVEL_OUTOF10: 8

## 2023-09-25 ASSESSMENT — PAIN DESCRIPTION - DESCRIPTORS: DESCRIPTORS: ACHING

## 2023-09-25 ASSESSMENT — PAIN - FUNCTIONAL ASSESSMENT: PAIN_FUNCTIONAL_ASSESSMENT: 0-10

## 2023-09-25 ASSESSMENT — PAIN DESCRIPTION - LOCATION: LOCATION: GENERALIZED

## 2023-09-25 NOTE — ED TRIAGE NOTES
Patient reports generalized body aches, difficulty sleeping and decreased appetite x3 weeks. States this began with chest congestion and nasal congestion 3 weeks ago, reports improvement in these symptoms. Was seen at [facility] ED this past Thursday.

## 2023-09-25 NOTE — ED PROVIDER NOTES
and explained thoroughly to patient and family members. Patient has been educated on strict return precautions as well as instructions for conservative care and follow-up. Patient will be discharged with prescription for prednisone and has been given opportunity to ask questions about this new medication. Patient verbalizes understanding and no socioeconomic barriers to care were identified during this visit. Patient expresses no further concerns at this time and will be discharged with AVS and education paperwork. Amount and/or Complexity of Data Reviewed  Labs: ordered. Radiology: ordered. Risk  Prescription drug management. REASSESSMENT            CONSULTS:  None    PROCEDURES:  Unless otherwise noted below, none     Procedures      FINAL IMPRESSION      1. Myalgia    2.  Malaise and fatigue          DISPOSITION/PLAN   DISPOSITION Decision To Discharge 09/25/2023 03:30:46 PM      PATIENT REFERRED TO:  [address] [place] [address] OFFICE  [address] [place] [address]  [phone]  Schedule an appointment as soon as possible for a visit       [address] [place] [address]   [phone]  Schedule an appointment as soon as possible for a visit       [facility] EMERGENCY DEPT  [address] [place] [address]  [phone]  Go to   If symptoms worsen or change      DISCHARGE MEDICATIONS:  Discharge Medication List as of 9/25/2023  3:37 PM        START taking these medications    Details   predniSONE (DELTASONE) 20 MG tablet Take 1 tablet by mouth 2 times daily for 5 days, Disp-10 tablet, R-0Normal               (Please note that portions of this note were completed with a voice recognition program.  Efforts were made to edit the dictations but occasionally words are mis-transcribed.)    DARIELA Chand (electronically signed)  Emergency Attending Physician / Physician Assistant / Nurse Practitioner

## 2023-09-25 NOTE — ED NOTES
Pt was discharged by PA Hasmukh  Pt verbalized good understanding of all discharge instructions, prescriptions, and follow up care. All questions answered. Pt in stable condition on discharge.       Beryl Sinclair RN  09/25/23 0286